# Patient Record
Sex: MALE | Race: WHITE | NOT HISPANIC OR LATINO
[De-identification: names, ages, dates, MRNs, and addresses within clinical notes are randomized per-mention and may not be internally consistent; named-entity substitution may affect disease eponyms.]

---
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
## 2025-01-05 NOTE — ED PEDIATRIC NURSE NOTE - SKIN TEMPERATURE MOISTURE
[] : Fellow [FreeTextEntry3] : Pt has extensive but small volume glendy disease and no other findings attributable to NHL. Reasonable to defer therapy at this time and repeat imaging in May as above. [id number] with pt that there is no survival benefit associated with early therapy, that the low but present risk of transformation to DLBCL is not affected by therapy and that there is a small probability - about 3% per yr - of having spontaneous regression of LAD. Her FL has a low proliferation index (10%-15%). warm